# Patient Record
Sex: MALE | Race: BLACK OR AFRICAN AMERICAN | NOT HISPANIC OR LATINO | URBAN - METROPOLITAN AREA
[De-identification: names, ages, dates, MRNs, and addresses within clinical notes are randomized per-mention and may not be internally consistent; named-entity substitution may affect disease eponyms.]

---

## 2017-01-14 ENCOUNTER — OUTPATIENT (OUTPATIENT)
Dept: OUTPATIENT SERVICES | Facility: HOSPITAL | Age: 67
LOS: 1 days | End: 2017-01-14

## 2017-04-25 ENCOUNTER — OUTPATIENT (OUTPATIENT)
Dept: OUTPATIENT SERVICES | Facility: HOSPITAL | Age: 67
LOS: 1 days | End: 2017-04-25

## 2017-07-29 ENCOUNTER — OUTPATIENT (OUTPATIENT)
Dept: OUTPATIENT SERVICES | Facility: HOSPITAL | Age: 67
LOS: 1 days | End: 2017-07-29

## 2018-02-01 ENCOUNTER — OUTPATIENT (OUTPATIENT)
Dept: OUTPATIENT SERVICES | Facility: HOSPITAL | Age: 68
LOS: 1 days | End: 2018-02-01

## 2018-05-14 ENCOUNTER — OUTPATIENT (OUTPATIENT)
Dept: OUTPATIENT SERVICES | Facility: HOSPITAL | Age: 68
LOS: 1 days | End: 2018-05-14

## 2018-12-03 ENCOUNTER — OUTPATIENT (OUTPATIENT)
Dept: OUTPATIENT SERVICES | Facility: HOSPITAL | Age: 68
LOS: 1 days | End: 2018-12-03

## 2019-03-12 ENCOUNTER — OUTPATIENT (OUTPATIENT)
Dept: OUTPATIENT SERVICES | Facility: HOSPITAL | Age: 69
LOS: 1 days | End: 2019-03-12

## 2019-03-15 ENCOUNTER — OUTPATIENT (OUTPATIENT)
Dept: OUTPATIENT SERVICES | Facility: HOSPITAL | Age: 69
LOS: 1 days | End: 2019-03-15

## 2019-06-10 ENCOUNTER — OUTPATIENT (OUTPATIENT)
Dept: OUTPATIENT SERVICES | Facility: HOSPITAL | Age: 69
LOS: 1 days | End: 2019-06-10

## 2019-09-16 ENCOUNTER — OUTPATIENT (OUTPATIENT)
Dept: OUTPATIENT SERVICES | Facility: HOSPITAL | Age: 69
LOS: 1 days | End: 2019-09-16

## 2019-12-23 ENCOUNTER — OUTPATIENT (OUTPATIENT)
Dept: OUTPATIENT SERVICES | Facility: HOSPITAL | Age: 69
LOS: 1 days | End: 2019-12-23

## 2020-03-23 ENCOUNTER — OUTPATIENT (OUTPATIENT)
Dept: OUTPATIENT SERVICES | Facility: HOSPITAL | Age: 70
LOS: 1 days | End: 2020-03-23

## 2020-06-22 ENCOUNTER — OUTPATIENT (OUTPATIENT)
Dept: OUTPATIENT SERVICES | Facility: HOSPITAL | Age: 70
LOS: 1 days | End: 2020-06-22

## 2020-09-21 ENCOUNTER — OUTPATIENT (OUTPATIENT)
Dept: OUTPATIENT SERVICES | Facility: HOSPITAL | Age: 70
LOS: 1 days | End: 2020-09-21

## 2021-03-29 ENCOUNTER — OUTPATIENT (OUTPATIENT)
Dept: OUTPATIENT SERVICES | Facility: HOSPITAL | Age: 71
LOS: 1 days | End: 2021-03-29

## 2021-08-31 ENCOUNTER — NON-APPOINTMENT (OUTPATIENT)
Age: 71
End: 2021-08-31

## 2021-08-31 ENCOUNTER — APPOINTMENT (OUTPATIENT)
Dept: OPHTHALMOLOGY | Facility: CLINIC | Age: 71
End: 2021-08-31
Payer: MEDICARE

## 2021-08-31 PROCEDURE — 92014 COMPRE OPH EXAM EST PT 1/>: CPT

## 2022-01-24 PROBLEM — Z00.00 ENCOUNTER FOR PREVENTIVE HEALTH EXAMINATION: Status: ACTIVE | Noted: 2022-01-24

## 2022-01-25 ENCOUNTER — APPOINTMENT (OUTPATIENT)
Dept: CARDIOLOGY | Facility: CLINIC | Age: 72
End: 2022-01-25
Payer: MEDICARE

## 2022-01-25 VITALS
WEIGHT: 215 LBS | SYSTOLIC BLOOD PRESSURE: 160 MMHG | BODY MASS INDEX: 27.59 KG/M2 | OXYGEN SATURATION: 96 % | HEIGHT: 74 IN | DIASTOLIC BLOOD PRESSURE: 92 MMHG | HEART RATE: 86 BPM

## 2022-01-25 DIAGNOSIS — M1A.0390 IDIOPATHIC CHRONIC GOUT, UNSPECIFIED WRIST, W/OUT TOPHUS (TOPHI): ICD-10-CM

## 2022-01-25 DIAGNOSIS — E11.9 TYPE 2 DIABETES MELLITUS W/OUT COMPLICATIONS: ICD-10-CM

## 2022-01-25 DIAGNOSIS — Z83.3 FAMILY HISTORY OF DIABETES MELLITUS: ICD-10-CM

## 2022-01-25 DIAGNOSIS — Z78.9 OTHER SPECIFIED HEALTH STATUS: ICD-10-CM

## 2022-01-25 DIAGNOSIS — M10.9 GOUT, UNSPECIFIED: ICD-10-CM

## 2022-01-25 PROCEDURE — 99204 OFFICE O/P NEW MOD 45 MIN: CPT

## 2022-01-25 RX ORDER — ALLOPURINOL 300 MG/1
300 TABLET ORAL DAILY
Qty: 90 | Refills: 1 | Status: ACTIVE | COMMUNITY

## 2022-01-25 RX ORDER — ENALAPRIL MALEATE 20 MG/1
20 TABLET ORAL TWICE DAILY
Qty: 180 | Refills: 1 | Status: ACTIVE | COMMUNITY

## 2022-01-25 RX ORDER — METOPROLOL TARTRATE 100 MG/1
100 TABLET, FILM COATED ORAL
Qty: 90 | Refills: 1 | Status: DISCONTINUED | COMMUNITY
End: 2022-01-25

## 2022-01-25 RX ORDER — METFORMIN HYDROCHLORIDE 500 MG/1
500 TABLET, COATED ORAL
Refills: 0 | Status: ACTIVE | COMMUNITY

## 2022-01-25 RX ORDER — FENOFIBRATE 48 MG/1
48 TABLET ORAL DAILY
Refills: 0 | Status: ACTIVE | COMMUNITY

## 2022-01-25 RX ORDER — CLONIDINE HYDROCHLORIDE 0.2 MG/1
0.2 TABLET ORAL
Refills: 0 | Status: ACTIVE | COMMUNITY

## 2022-01-25 RX ORDER — GLIMEPIRIDE 2 MG/1
2 TABLET ORAL
Refills: 0 | Status: ACTIVE | COMMUNITY

## 2022-01-25 NOTE — DISCUSSION/SUMMARY
[FreeTextEntry1] : 1. Abnormal ECG: T wave inversions anterolateral. This could represent secondary repolarization changes due to LVH from uncontrolled HTN, however given risk factors also need to evaluate for obstructive CAD as the cause as well. Recommend pharmacologic nuclear stress testing to evaluate for ischemia. Please note it should be pharmacologic as patient unable to ambulate effectively on treadmill given joint pain.\par \par 2. HTN: appears uncontrolled on multiple agents. Patient on Toprol XL 100mg daily (high risk medication with no signs of toxicity), clonidine 01.mg BID, amlodipine 10mg daily and enalapril 20mg daily. Recommend d/c metoprolol and prescribe labetalol 200mg BID for better BP control. Goal BP should be less than 130/80. Recommend low salt diet. \par \par 3. HLD/hypertriglyceridemia: patient on fenofibrate 48mg daily. Recommend starting atorvastatin 40mg daily in addition given elevated 10 year ASCVD Risk score.

## 2022-01-25 NOTE — PHYSICAL EXAM
[Normal S1, S2] : normal S1, S2 [Murmur] : murmur [Normal] : moves all extremities, no focal deficits, normal speech [de-identified] : No carotid bruits auscultated bilaterally [de-identified] : 2/6 systolic ejection murmur base of heart.

## 2022-01-25 NOTE — HISTORY OF PRESENT ILLNESS
[FreeTextEntry1] : 71 year old male with history of HLD, hypertriglyceridemia, DM, HTN presents for a cardiac evaluation because of an abnormal ECG performed with PCP. Patient denies any chest pain, SOB, or palpitations. Patient admits to compliance with medical therapy and reports no adverse effects. There is no history of MI, CVA, CHF, or previous coronary intervention.\par

## 2022-02-01 ENCOUNTER — APPOINTMENT (OUTPATIENT)
Dept: CARDIOLOGY | Facility: CLINIC | Age: 72
End: 2022-02-01

## 2022-02-14 ENCOUNTER — APPOINTMENT (OUTPATIENT)
Dept: CARDIOLOGY | Facility: CLINIC | Age: 72
End: 2022-02-14
Payer: MEDICARE

## 2022-02-14 PROCEDURE — A9502: CPT

## 2022-02-14 PROCEDURE — 93306 TTE W/DOPPLER COMPLETE: CPT

## 2022-02-14 PROCEDURE — 93015 CV STRESS TEST SUPVJ I&R: CPT

## 2022-02-14 PROCEDURE — 78452 HT MUSCLE IMAGE SPECT MULT: CPT

## 2022-02-22 ENCOUNTER — APPOINTMENT (OUTPATIENT)
Dept: CARDIOLOGY | Facility: CLINIC | Age: 72
End: 2022-02-22
Payer: MEDICARE

## 2022-02-22 VITALS
BODY MASS INDEX: 27.59 KG/M2 | WEIGHT: 215 LBS | OXYGEN SATURATION: 98 % | HEART RATE: 72 BPM | SYSTOLIC BLOOD PRESSURE: 164 MMHG | DIASTOLIC BLOOD PRESSURE: 80 MMHG | HEIGHT: 74 IN

## 2022-02-22 PROCEDURE — 99215 OFFICE O/P EST HI 40 MIN: CPT

## 2022-02-22 RX ORDER — AMLODIPINE BESYLATE 10 MG/1
10 TABLET ORAL DAILY
Qty: 90 | Refills: 3 | Status: DISCONTINUED | COMMUNITY
End: 2022-02-22

## 2022-02-22 NOTE — HISTORY OF PRESENT ILLNESS
[FreeTextEntry1] : 72 year old male with history of HLD, hypertriglyceridemia, DM, HTN presents for a cardiac evaluation because of an abnormal ECG performed with PCP. Patient denies any chest pain, SOB, or palpitations. Patient admits to compliance with medical therapy and reports no adverse effects. There is no history of MI, CVA, CHF, or previous coronary intervention.\par

## 2022-02-22 NOTE — DISCUSSION/SUMMARY
[FreeTextEntry1] : 1. Abnormal ECG: T wave inversions anterolateral. This could represent secondary repolarization changes due to LVH from uncontrolled HTN. No ischemia noted on pharmacologic nuclear stress testing on 2/14/2022.\par \par 2. HTN: appears uncontrolled on multiple agents. Patient on Labetalol 200mg BID (high risk medication with no signs of toxicity), clonidine 01.mg BID, amlodipine 10mg daily and enalapril 20mg daily. Recommend d/c amlodipine and start Nifedipine ER 30mg daily and add HCTZ 25mg daily for better BP control. Goal BP should be less than 130/80. Recommend low salt diet. \par \par 3. HLD/hypertriglyceridemia: patient on fenofibrate 48mg daily. Recommend starting atorvastatin 40mg daily in addition given elevated 10 year ASCVD Risk score. \par \par Follow up in 3 months.

## 2022-02-22 NOTE — PHYSICAL EXAM
[Normal S1, S2] : normal S1, S2 [Murmur] : murmur [Normal] : moves all extremities, no focal deficits, normal speech [de-identified] : No carotid bruits auscultated bilaterally [de-identified] : 2/6 systolic ejection murmur base of heart.

## 2022-02-22 NOTE — CARDIOLOGY SUMMARY
[de-identified] : 1/24/2022, NSR with T wave inversions anterolateral. [de-identified] : 2/14/2022, Pharmacologic Nuclear Stress Testing: no ischemia or evidence of prior infarction noted on SPECT images.\par  [de-identified] : 2/14/2022, LV EF 60-65%, LVH, mild TR, mild PI, PASP estimated to be 35mmHg. Asymmetric septal hypertrophy.

## 2022-08-09 ENCOUNTER — APPOINTMENT (OUTPATIENT)
Dept: OPHTHALMOLOGY | Facility: CLINIC | Age: 72
End: 2022-08-09

## 2022-08-09 ENCOUNTER — NON-APPOINTMENT (OUTPATIENT)
Age: 72
End: 2022-08-09

## 2022-08-09 PROCEDURE — 92014 COMPRE OPH EXAM EST PT 1/>: CPT

## 2022-09-12 ENCOUNTER — APPOINTMENT (OUTPATIENT)
Dept: CARDIOLOGY | Facility: CLINIC | Age: 72
End: 2022-09-12

## 2022-09-12 VITALS
SYSTOLIC BLOOD PRESSURE: 138 MMHG | TEMPERATURE: 98 F | OXYGEN SATURATION: 98 % | WEIGHT: 214 LBS | BODY MASS INDEX: 27.46 KG/M2 | HEIGHT: 74 IN | DIASTOLIC BLOOD PRESSURE: 80 MMHG | HEART RATE: 88 BPM

## 2022-09-12 PROCEDURE — 99215 OFFICE O/P EST HI 40 MIN: CPT

## 2022-09-12 RX ORDER — NIFEDIPINE 30 MG/1
30 TABLET, FILM COATED, EXTENDED RELEASE ORAL
Qty: 90 | Refills: 0 | Status: DISCONTINUED | COMMUNITY
Start: 2022-02-22

## 2022-09-12 RX ORDER — METFORMIN ER 500 MG 500 MG/1
500 TABLET ORAL
Qty: 360 | Refills: 0 | Status: DISCONTINUED | COMMUNITY
Start: 2022-01-24

## 2022-09-12 RX ORDER — HYDROCHLOROTHIAZIDE 25 MG/1
25 TABLET ORAL DAILY
Qty: 90 | Refills: 3 | Status: DISCONTINUED | COMMUNITY
Start: 2022-02-22 | End: 2022-09-12

## 2022-09-12 NOTE — DISCUSSION/SUMMARY
[FreeTextEntry1] : 1. Abnormal ECG: T wave inversions anterolateral. This could represent secondary repolarization changes due to LVH from uncontrolled HTN. No ischemia noted on pharmacologic nuclear stress testing on 2/14/2022.\par \par 2. HTN: appears better controlled, but nausea on HCTZ so he stopped it. Patient on Labetalol 200mg BID (high risk medication with no signs of toxicity), clonidine 0.2mg BID, Nifedipine ER 30mg daily, and enalapril 20mg BID. Recommend continuing off HCTZ and increase Nifedipine to 60mg daily. Goal BP should be less than 130/80. Recommend low salt diet. \par \par 3. HLD/hypertriglyceridemia: patient on fenofibrate 48mg daily. Recommend continuing atorvastatin 40mg daily in addition given elevated 10 year ASCVD Risk score. \par \par Follow up in 4 months.

## 2022-09-12 NOTE — PHYSICAL EXAM
[Normal S1, S2] : normal S1, S2 [Murmur] : murmur [Normal] : moves all extremities, no focal deficits, normal speech [de-identified] : No carotid bruits auscultated bilaterally [de-identified] : 2/6 systolic ejection murmur base of heart.

## 2022-09-12 NOTE — CARDIOLOGY SUMMARY
[de-identified] : 1/24/2022, NSR with T wave inversions anterolateral. [de-identified] : 2/14/2022, Pharmacologic Nuclear Stress Testing: no ischemia or evidence of prior infarction noted on SPECT images.\par  [de-identified] : 2/14/2022, LV EF 60-65%, LVH, mild TR, mild PI, PASP estimated to be 35mmHg. Asymmetric septal hypertrophy.

## 2022-09-12 NOTE — HISTORY OF PRESENT ILLNESS
[FreeTextEntry1] : Historical Perspective:\par 72 year old male with history of HLD, hypertriglyceridemia, DM, HTN presents for a cardiac evaluation because of an abnormal ECG performed with PCP. Patient denies any chest pain, SOB, or palpitations. Patient admits to compliance with medical therapy and reports no adverse effects. There is no history of MI, CVA, CHF, or previous coronary intervention.\par \par Current Health Status:\par Patient with no chest pain, SOB, or palpitations. No hospitalizations since seeing me last. Patient states he stopped the HCTZ because it made him feel nausea. \par

## 2023-01-12 ENCOUNTER — APPOINTMENT (OUTPATIENT)
Dept: CARDIOLOGY | Facility: CLINIC | Age: 73
End: 2023-01-12
Payer: MEDICARE

## 2023-01-12 ENCOUNTER — NON-APPOINTMENT (OUTPATIENT)
Age: 73
End: 2023-01-12

## 2023-01-12 VITALS
OXYGEN SATURATION: 98 % | WEIGHT: 214 LBS | DIASTOLIC BLOOD PRESSURE: 82 MMHG | SYSTOLIC BLOOD PRESSURE: 142 MMHG | BODY MASS INDEX: 27.46 KG/M2 | HEIGHT: 74 IN | HEART RATE: 96 BPM

## 2023-01-12 PROCEDURE — 99215 OFFICE O/P EST HI 40 MIN: CPT

## 2023-01-12 PROCEDURE — 93000 ELECTROCARDIOGRAM COMPLETE: CPT

## 2023-01-12 NOTE — CARDIOLOGY SUMMARY
[de-identified] : 1/12/2023, NSR with T wave inversions anterolateral. [de-identified] : 2/14/2022, Pharmacologic Nuclear Stress Testing: no ischemia or evidence of prior infarction noted on SPECT images.\par  [de-identified] : 2/14/2022, LV EF 60-65%, LVH, mild TR, mild PI, PASP estimated to be 35mmHg. Asymmetric septal hypertrophy.

## 2023-01-12 NOTE — DISCUSSION/SUMMARY
[FreeTextEntry1] : 1. Abnormal ECG: T wave inversions anterolateral. This could represent secondary repolarization changes due to LVH from uncontrolled HTN. No ischemia noted on pharmacologic nuclear stress testing on 2/14/2022.\par \par 2. HTN: appears better controlled, but nausea on HCTZ so he stopped it. BP still mildly elevated.  Recommend increasing Labetalol to 400mg BID (high risk medication with no signs of toxicity), clonidine 0.2mg BID, Nifedipine ER 30mg daily, and enalapril 20mg BID. Recommend continuing off HCTZ and increase Nifedipine to 60mg daily. Goal BP should be less than 130/80. Recommend low salt diet. \par \par 3. HLD/hypertriglyceridemia: patient on fenofibrate 48mg daily. Recommend continuing atorvastatin 40mg daily in addition given elevated 10 year ASCVD Risk score. \par \par Follow up in 4 months.  [EKG obtained to assist in diagnosis and management of assessed problem(s)] : EKG obtained to assist in diagnosis and management of assessed problem(s)

## 2023-01-12 NOTE — PHYSICAL EXAM
[Normal S1, S2] : normal S1, S2 [Murmur] : murmur [Normal] : moves all extremities, no focal deficits, normal speech [de-identified] : No carotid bruits auscultated bilaterally [de-identified] : 2/6 systolic ejection murmur base of heart.

## 2023-03-01 ENCOUNTER — APPOINTMENT (OUTPATIENT)
Dept: UROLOGY | Facility: CLINIC | Age: 73
End: 2023-03-01

## 2023-03-01 NOTE — HISTORY OF PRESENT ILLNESS
[FreeTextEntry1] : 73 year old male with recently diagnosed elevated PSA of 279 ng/mL. SCr=1.3\par Denies family history of prostate cancer.\par Denies maternal history of breast cancer. \par Denies recent UTI or Sx of fever, frequency, urgency, dysuria, hematuria, penile discharge. Denies prolonged bike rides/ trauma to perineum, Urethral instrumentation, or ejaculation prior to PSA lab draw. \par \par Endorses  stream quality. \par IPSS = \par YOSI =  \par Has never had a prostate biopsy. Does not take 5 alpha reductase inhibitor. Has never had prostate imaging.  Denies back pain, bone pain, weight loss.\par

## 2023-08-08 ENCOUNTER — NON-APPOINTMENT (OUTPATIENT)
Age: 73
End: 2023-08-08

## 2023-08-08 ENCOUNTER — APPOINTMENT (OUTPATIENT)
Dept: OPHTHALMOLOGY | Facility: CLINIC | Age: 73
End: 2023-08-08
Payer: MEDICARE

## 2023-08-08 PROCEDURE — 92014 COMPRE OPH EXAM EST PT 1/>: CPT

## 2023-08-14 ENCOUNTER — APPOINTMENT (OUTPATIENT)
Dept: CARDIOLOGY | Facility: CLINIC | Age: 73
End: 2023-08-14
Payer: MEDICARE

## 2023-08-14 ENCOUNTER — APPOINTMENT (OUTPATIENT)
Dept: CARDIOLOGY | Facility: CLINIC | Age: 73
End: 2023-08-14

## 2023-08-14 VITALS
SYSTOLIC BLOOD PRESSURE: 144 MMHG | BODY MASS INDEX: 26.44 KG/M2 | OXYGEN SATURATION: 99 % | WEIGHT: 206 LBS | HEIGHT: 74 IN | HEART RATE: 80 BPM | DIASTOLIC BLOOD PRESSURE: 76 MMHG

## 2023-08-14 PROCEDURE — 93000 ELECTROCARDIOGRAM COMPLETE: CPT

## 2023-08-14 PROCEDURE — 99214 OFFICE O/P EST MOD 30 MIN: CPT

## 2023-08-14 RX ORDER — FEBUXOSTAT 80 MG/1
80 TABLET ORAL DAILY
Refills: 0 | Status: ACTIVE | COMMUNITY

## 2023-08-14 NOTE — CARDIOLOGY SUMMARY
[de-identified] : 8/14/2023, NSR with inferolateral T wave inversions. 1/12/2023, NSR with T wave inversions anterolateral. [de-identified] : 2/14/2022, Pharmacologic Nuclear Stress Testing: no ischemia or evidence of prior infarction noted on SPECT images.\par   [de-identified] : 2/14/2022, LV EF 60-65%, LVH, mild TR, mild PI, PASP estimated to be 35mmHg. Asymmetric septal hypertrophy.

## 2023-08-14 NOTE — DISCUSSION/SUMMARY
[FreeTextEntry1] : 1. Abnormal ECG: T wave inversions inferolateral. This could represent secondary repolarization changes due to LVH from uncontrolled HTN. No ischemia noted on pharmacologic nuclear stress testing on 2/14/2022. Periodic echo surveillance recommended.  2. HTN: appears better controlled, but nausea on HCTZ so he stopped it. Recommend continuing Labetalol to 400mg BID (high risk medication with no signs of toxicity), clonidine 0.2mg BID, Nifedipine ER 60mg daily, and enalapril 20mg BID. Recommend continuing off HCTZ.  Goal BP should be less than 130/80. Recommend low salt diet.   3. HLD/hypertriglyceridemia: patient on fenofibrate 48mg daily. Recommend continuing atorvastatin 40mg daily in addition given elevated 10 year ASCVD Risk score.   Follow up in 6 months.  [EKG obtained to assist in diagnosis and management of assessed problem(s)] : EKG obtained to assist in diagnosis and management of assessed problem(s)

## 2023-08-14 NOTE — HISTORY OF PRESENT ILLNESS
[FreeTextEntry1] : Historical Perspective: 73 year old male with history of HLD, hypertriglyceridemia, DM, HTN presents for a cardiac evaluation because of an abnormal ECG performed with PCP. Patient denies any chest pain, SOB, or palpitations. Patient admits to compliance with medical therapy and reports no adverse effects. There is no history of MI, CVA, CHF, or previous coronary intervention.  Current Health Status: Patient with no chest pain, SOB, or palpitations. No hospitalizations since seeing me last. Patient states he stopped the HCTZ because it made him feel nausea.

## 2023-08-14 NOTE — PHYSICAL EXAM
[Normal S1, S2] : normal S1, S2 [Murmur] : murmur [Normal] : moves all extremities, no focal deficits, normal speech [de-identified] : No carotid bruits auscultated bilaterally [de-identified] : 2/6 systolic ejection murmur base of heart.

## 2023-10-31 RX ORDER — NIFEDIPINE 60 MG/1
60 TABLET, EXTENDED RELEASE ORAL
Qty: 90 | Refills: 3 | Status: ACTIVE | COMMUNITY
Start: 2022-02-22 | End: 1900-01-01

## 2023-11-18 LAB — HBA1C MFR BLD HPLC: 6.6

## 2024-01-04 RX ORDER — LABETALOL HYDROCHLORIDE 200 MG/1
200 TABLET, FILM COATED ORAL TWICE DAILY
Qty: 360 | Refills: 2 | Status: ACTIVE | COMMUNITY
Start: 2022-01-25 | End: 1900-01-01

## 2024-02-14 ENCOUNTER — LABORATORY RESULT (OUTPATIENT)
Age: 74
End: 2024-02-14

## 2024-02-26 LAB — CK SERPL-CCNC: 485 U/L

## 2024-02-29 ENCOUNTER — APPOINTMENT (OUTPATIENT)
Dept: CARDIOLOGY | Facility: CLINIC | Age: 74
End: 2024-02-29
Payer: MEDICARE

## 2024-02-29 ENCOUNTER — NON-APPOINTMENT (OUTPATIENT)
Age: 74
End: 2024-02-29

## 2024-02-29 VITALS
WEIGHT: 205 LBS | BODY MASS INDEX: 26.31 KG/M2 | HEART RATE: 82 BPM | OXYGEN SATURATION: 97 % | DIASTOLIC BLOOD PRESSURE: 82 MMHG | HEIGHT: 74 IN | SYSTOLIC BLOOD PRESSURE: 152 MMHG

## 2024-02-29 PROCEDURE — 93000 ELECTROCARDIOGRAM COMPLETE: CPT

## 2024-02-29 PROCEDURE — 93306 TTE W/DOPPLER COMPLETE: CPT

## 2024-02-29 PROCEDURE — G2211 COMPLEX E/M VISIT ADD ON: CPT

## 2024-02-29 PROCEDURE — 99215 OFFICE O/P EST HI 40 MIN: CPT

## 2024-02-29 RX ORDER — HYDRALAZINE HYDROCHLORIDE 50 MG/1
50 TABLET ORAL
Qty: 180 | Refills: 3 | Status: ACTIVE | COMMUNITY
Start: 2024-02-29 | End: 1900-01-01

## 2024-02-29 RX ORDER — ATORVASTATIN CALCIUM 40 MG/1
40 TABLET, FILM COATED ORAL
Qty: 90 | Refills: 3 | Status: ACTIVE | COMMUNITY
Start: 2022-01-25 | End: 1900-01-01

## 2024-02-29 NOTE — PHYSICAL EXAM
[Normal S1, S2] : normal S1, S2 [Murmur] : murmur [Normal] : moves all extremities, no focal deficits, normal speech [de-identified] : No carotid bruits auscultated bilaterally [de-identified] : 2/6 systolic ejection murmur base of heart.

## 2024-02-29 NOTE — DISCUSSION/SUMMARY
[EKG obtained to assist in diagnosis and management of assessed problem(s)] : EKG obtained to assist in diagnosis and management of assessed problem(s) [FreeTextEntry1] : 1. Abnormal ECG: T wave inversions inferolateral. This could represent secondary repolarization changes due to LVH from uncontrolled HTN. No ischemia noted on pharmacologic nuclear stress testing on 2/14/2022. Periodic echo surveillance recommended.  2. HTN: appeared better controlled, but nausea on HCTZ so he stopped it. Recommend continuing Labetalol to 400mg BID (high risk medication with no signs of toxicity), clonidine 0.2mg BID, Nifedipine ER 60mg daily, and enalapril 20mg BID. Recommend continuing off HCTZ, but start hydralazine 50mg BID.  Goal BP should be less than 130/80. Recommend low salt diet.   3. HLD/hypertriglyceridemia: patient on fenofibrate 48mg daily. Recommend continuing atorvastatin 40mg daily in addition given elevated 10 year ASCVD Risk score.   Follow up in 2 months.

## 2024-02-29 NOTE — CARDIOLOGY SUMMARY
[de-identified] : 2/29/2024, NSR with inferolateral T wave inversions. 8/14/2023, NSR with inferolateral T wave inversions. 1/12/2023, NSR with T wave inversions anterolateral. [de-identified] : 2/14/2022, Pharmacologic Nuclear Stress Testing: no ischemia or evidence of prior infarction noted on SPECT images.\par   [de-identified] : 2/29/2024, LV EF 54%, mild MR, mild-moderate LVH 2/14/2022, LV EF 60-65%, LVH, mild TR, mild PI, PASP estimated to be 35mmHg. Asymmetric septal hypertrophy.

## 2024-02-29 NOTE — HISTORY OF PRESENT ILLNESS
[FreeTextEntry1] : Historical Perspective: 74 year old male with history of HLD, hypertriglyceridemia, DM, HTN presents for a cardiac evaluation because of an abnormal ECG performed with PCP. Patient denies any chest pain, SOB, or palpitations. Patient admits to compliance with medical therapy and reports no adverse effects. There is no history of MI, CVA, CHF, or previous coronary intervention.  Current Health Status: Patient with no chest pain, SOB, or palpitations. No hospitalizations since seeing me last. Patient states he stopped the HCTZ because it made him feel nausea. Now BP elevated

## 2024-05-30 ENCOUNTER — APPOINTMENT (OUTPATIENT)
Dept: CARDIOLOGY | Facility: CLINIC | Age: 74
End: 2024-05-30
Payer: MEDICARE

## 2024-05-30 VITALS
DIASTOLIC BLOOD PRESSURE: 60 MMHG | OXYGEN SATURATION: 98 % | HEIGHT: 74 IN | HEART RATE: 82 BPM | SYSTOLIC BLOOD PRESSURE: 126 MMHG | WEIGHT: 205 LBS | BODY MASS INDEX: 26.31 KG/M2

## 2024-05-30 DIAGNOSIS — E78.00 PURE HYPERCHOLESTEROLEMIA, UNSPECIFIED: ICD-10-CM

## 2024-05-30 DIAGNOSIS — R94.31 ABNORMAL ELECTROCARDIOGRAM [ECG] [EKG]: ICD-10-CM

## 2024-05-30 DIAGNOSIS — I10 ESSENTIAL (PRIMARY) HYPERTENSION: ICD-10-CM

## 2024-05-30 DIAGNOSIS — Z79.899 OTHER LONG TERM (CURRENT) DRUG THERAPY: ICD-10-CM

## 2024-05-30 DIAGNOSIS — E78.1 PURE HYPERGLYCERIDEMIA: ICD-10-CM

## 2024-05-30 PROCEDURE — G2211 COMPLEX E/M VISIT ADD ON: CPT

## 2024-05-30 PROCEDURE — 99215 OFFICE O/P EST HI 40 MIN: CPT

## 2024-05-30 NOTE — HISTORY OF PRESENT ILLNESS
[FreeTextEntry1] : Historical Perspective: 74 year old male with history of HLD, hypertriglyceridemia, DM, HTN presents for a cardiac evaluation because of an abnormal ECG performed with PCP. Patient denies any chest pain, SOB, or palpitations. Patient admits to compliance with medical therapy and reports no adverse effects. There is no history of MI, CVA, CHF, or previous coronary intervention.  Current Health Status: Patient with no chest pain, SOB, or palpitations. No hospitalizations since seeing me last. Compliant with medications and reports no adverse effects.

## 2024-05-30 NOTE — CARDIOLOGY SUMMARY
[de-identified] : 2/29/2024, NSR with inferolateral T wave inversions. 8/14/2023, NSR with inferolateral T wave inversions. 1/12/2023, NSR with T wave inversions anterolateral. [de-identified] : 2/14/2022, Pharmacologic Nuclear Stress Testing: no ischemia or evidence of prior infarction noted on SPECT images.\par   [de-identified] : 2/29/2024, LV EF 54%, mild MR, mild-moderate LVH 2/14/2022, LV EF 60-65%, LVH, mild TR, mild PI, PASP estimated to be 35mmHg. Asymmetric septal hypertrophy.

## 2024-05-30 NOTE — PHYSICAL EXAM
[Normal S1, S2] : normal S1, S2 [Murmur] : murmur [Normal] : moves all extremities, no focal deficits, normal speech [de-identified] : No carotid bruits auscultated bilaterally [de-identified] : 2/6 systolic ejection murmur base of heart.

## 2024-05-30 NOTE — DISCUSSION/SUMMARY
[FreeTextEntry1] : 1. Abnormal ECG: T wave inversions inferolateral. This could represent secondary repolarization changes due to LVH from uncontrolled HTN. No ischemia noted on pharmacologic nuclear stress testing on 2/14/2022. Periodic echo surveillance recommended.  2. HTN: appeared better controlled, but nausea on HCTZ so he stopped it. Recommend continuing Labetalol to 400mg BID (high risk medication with no signs of toxicity), clonidine 0.2mg BID, Nifedipine ER 60mg daily, and enalapril 20mg BID. Recommend continuing off HCTZ, and continue hydralazine 50mg BID.  Goal BP should be less than 130/80. Recommend low salt diet.   3. HLD/hypertriglyceridemia: patient on fenofibrate 48mg daily. Recommend continuing atorvastatin 40mg daily in addition given elevated 10 year ASCVD Risk score.   Follow up in 6 months.

## 2024-08-13 ENCOUNTER — APPOINTMENT (OUTPATIENT)
Dept: OPHTHALMOLOGY | Facility: CLINIC | Age: 74
End: 2024-08-13
Payer: MEDICARE

## 2024-08-13 ENCOUNTER — NON-APPOINTMENT (OUTPATIENT)
Age: 74
End: 2024-08-13

## 2024-08-13 PROCEDURE — 92014 COMPRE OPH EXAM EST PT 1/>: CPT

## 2024-08-13 PROCEDURE — 92134 CPTRZ OPH DX IMG PST SGM RTA: CPT

## 2024-11-05 ENCOUNTER — NON-APPOINTMENT (OUTPATIENT)
Age: 74
End: 2024-11-05

## 2024-11-05 ENCOUNTER — APPOINTMENT (OUTPATIENT)
Dept: OPHTHALMOLOGY | Facility: CLINIC | Age: 74
End: 2024-11-05
Payer: MEDICARE

## 2024-11-05 PROCEDURE — 92134 CPTRZ OPH DX IMG PST SGM RTA: CPT

## 2024-11-05 PROCEDURE — 92014 COMPRE OPH EXAM EST PT 1/>: CPT

## 2024-11-29 ENCOUNTER — NON-APPOINTMENT (OUTPATIENT)
Age: 74
End: 2024-11-29

## 2024-11-29 ENCOUNTER — APPOINTMENT (OUTPATIENT)
Dept: CARDIOLOGY | Facility: CLINIC | Age: 74
End: 2024-11-29
Payer: MEDICARE

## 2024-11-29 VITALS
HEART RATE: 82 BPM | OXYGEN SATURATION: 97 % | WEIGHT: 215 LBS | SYSTOLIC BLOOD PRESSURE: 132 MMHG | DIASTOLIC BLOOD PRESSURE: 60 MMHG | BODY MASS INDEX: 27.59 KG/M2 | HEIGHT: 74 IN

## 2024-11-29 DIAGNOSIS — I10 ESSENTIAL (PRIMARY) HYPERTENSION: ICD-10-CM

## 2024-11-29 DIAGNOSIS — E78.1 PURE HYPERGLYCERIDEMIA: ICD-10-CM

## 2024-11-29 DIAGNOSIS — E78.00 PURE HYPERCHOLESTEROLEMIA, UNSPECIFIED: ICD-10-CM

## 2024-11-29 DIAGNOSIS — R94.31 ABNORMAL ELECTROCARDIOGRAM [ECG] [EKG]: ICD-10-CM

## 2024-11-29 PROCEDURE — G2211 COMPLEX E/M VISIT ADD ON: CPT

## 2024-11-29 PROCEDURE — 99214 OFFICE O/P EST MOD 30 MIN: CPT

## 2024-11-29 PROCEDURE — 93000 ELECTROCARDIOGRAM COMPLETE: CPT

## 2025-05-22 ENCOUNTER — NON-APPOINTMENT (OUTPATIENT)
Age: 75
End: 2025-05-22

## 2025-05-22 ENCOUNTER — APPOINTMENT (OUTPATIENT)
Dept: CARDIOLOGY | Facility: CLINIC | Age: 75
End: 2025-05-22
Payer: MEDICARE

## 2025-05-22 VITALS
HEART RATE: 75 BPM | DIASTOLIC BLOOD PRESSURE: 70 MMHG | BODY MASS INDEX: 29.77 KG/M2 | WEIGHT: 232 LBS | SYSTOLIC BLOOD PRESSURE: 142 MMHG | HEIGHT: 74 IN | OXYGEN SATURATION: 96 %

## 2025-05-22 DIAGNOSIS — I10 ESSENTIAL (PRIMARY) HYPERTENSION: ICD-10-CM

## 2025-05-22 DIAGNOSIS — E78.00 PURE HYPERCHOLESTEROLEMIA, UNSPECIFIED: ICD-10-CM

## 2025-05-22 DIAGNOSIS — R94.31 ABNORMAL ELECTROCARDIOGRAM [ECG] [EKG]: ICD-10-CM

## 2025-05-22 PROCEDURE — G2211 COMPLEX E/M VISIT ADD ON: CPT

## 2025-05-22 PROCEDURE — 93000 ELECTROCARDIOGRAM COMPLETE: CPT

## 2025-05-22 PROCEDURE — 93306 TTE W/DOPPLER COMPLETE: CPT

## 2025-05-22 PROCEDURE — 99214 OFFICE O/P EST MOD 30 MIN: CPT

## 2025-05-22 RX ORDER — PIOGLITAZONE 15 MG/1
15 TABLET ORAL DAILY
Refills: 0 | Status: ACTIVE | COMMUNITY